# Patient Record
Sex: FEMALE | Race: AMERICAN INDIAN OR ALASKA NATIVE
[De-identification: names, ages, dates, MRNs, and addresses within clinical notes are randomized per-mention and may not be internally consistent; named-entity substitution may affect disease eponyms.]

---

## 2018-08-18 ENCOUNTER — HOSPITAL ENCOUNTER (INPATIENT)
Dept: HOSPITAL 14 - H.EROB2 | Age: 28
LOS: 2 days | Discharge: HOME | DRG: 373 | End: 2018-08-20
Attending: OBSTETRICS & GYNECOLOGY | Admitting: OBSTETRICS & GYNECOLOGY
Payer: COMMERCIAL

## 2018-08-18 VITALS — BODY MASS INDEX: 25.8 KG/M2

## 2018-08-18 VITALS — OXYGEN SATURATION: 98 % | RESPIRATION RATE: 20 BRPM

## 2018-08-18 DIAGNOSIS — Z3A.38: ICD-10-CM

## 2018-08-18 LAB
BASOPHILS # BLD AUTO: 0 K/UL (ref 0–0.2)
BASOPHILS NFR BLD: 0.3 % (ref 0–2)
EOSINOPHIL # BLD AUTO: 0 K/UL (ref 0–0.7)
EOSINOPHIL NFR BLD: 0.4 % (ref 0–4)
ERYTHROCYTE [DISTWIDTH] IN BLOOD BY AUTOMATED COUNT: 14.2 % (ref 11.5–14.5)
HGB BLD-MCNC: 10.9 G/DL (ref 12–16)
LYMPHOCYTES # BLD AUTO: 2 K/UL (ref 1–4.3)
LYMPHOCYTES NFR BLD AUTO: 18.3 % (ref 20–40)
MCH RBC QN AUTO: 31.6 PG (ref 27–31)
MCHC RBC AUTO-ENTMCNC: 33.7 G/DL (ref 33–37)
MCV RBC AUTO: 93.8 FL (ref 81–99)
MONOCYTES # BLD: 0.7 K/UL (ref 0–0.8)
MONOCYTES NFR BLD: 6.4 % (ref 0–10)
NEUTROPHILS # BLD: 8 K/UL (ref 1.8–7)
NEUTROPHILS NFR BLD AUTO: 74.6 % (ref 50–75)
NRBC BLD AUTO-RTO: 0.2 % (ref 0–0)
PLATELET # BLD: 131 K/UL (ref 130–400)
PMV BLD AUTO: 10.7 FL (ref 7.2–11.7)
RBC # BLD AUTO: 3.46 MIL/UL (ref 3.8–5.2)
WBC # BLD AUTO: 10.8 K/UL (ref 4.8–10.8)

## 2018-08-18 PROCEDURE — 10907ZC DRAINAGE OF AMNIOTIC FLUID, THERAPEUTIC FROM PRODUCTS OF CONCEPTION, VIA NATURAL OR ARTIFICIAL OPENING: ICD-10-PCS | Performed by: OBSTETRICS & GYNECOLOGY

## 2018-08-18 PROCEDURE — 4A1HXCZ MONITORING OF PRODUCTS OF CONCEPTION, CARDIAC RATE, EXTERNAL APPROACH: ICD-10-PCS | Performed by: OBSTETRICS & GYNECOLOGY

## 2018-08-18 NOTE — OBHP
===========================

Datetime: 2018 03:57

===========================

   

IP Adm Impression:  Term, intrauterine pregnancy

IP Admit Plan:  Admit to unit; Initiate labor protocol

Admit Comment, IP Provider:  20 yo  at 38+4 wks in labor.  FHT reassuring.  GBS negative.

   Anticipate VD.

   H_P dictated, "[id number]" (ES) 

Extremities - PN:  Normal

Abdomen - PN:  Normal

Back - PN:  Normal

Lungs - PN:  Normal

Heart - PN:  Normal

Thyroid - PN:  Normal

Neurologic - PN:  Normal

HEENT - PN:  Normal

General - PN:  Normal

FHR - Baseline A Provider:  130's

Membranes, Provider:  Bridget

EGA AdmitDate IP:  38.4

Vital Signs Provider:  Reviewed

IP Indication for Induction:  Not Applicable

IP Chief Complaint:  Uterine contractions

NICHD Variability Prov Fetus A:  Moderate 6-25bpm

NICHD Accel Fetus A IP Provider:  15X15

FHR Category Provider Fetus A:  Category I

NICHD Decel Fetus A IP Provider:  None

Dilatation, Provider:  3-4

Effacement, Provider:  75

Station, Provider:  -3

Genitourinary Exam:  Normal

## 2018-08-18 NOTE — HP
Copied To:  Africa Gusman MD

Attending MD:  Africa Gusman MD



HISTORY OF PRESENT ILLNESS:  This is a 27-year-old G7, P6-0-0-6 at 38 weeks

and four days with an EDC of 08/28/2018 by last menstrual period, who

presents with contractions every five minutes.  Denies vaginal bleeding and

leaking of fluid, and reports positive fetal movement.  This patient

received her prenatal care at [facility] with Dr. Berman.  She started her

prenatal care at 31 weeks and she is varicella nonimmune.  GBS was done on

07/27/2018 and that was negative.



PAST MEDICAL HISTORY:  Healthy.



PAST SURGICAL HISTORY:  None.



Her EDC is 08/28/2018 based on her period consistent with a 32 plus week

ultrasound.



MEDICATIONS:  Prenatal vitamins.



ALLERGIES:  NO KNOWN DRUG ALLERGIES.



FAMILY HISTORY:  Noncontributory.



OBSTETRICAL HISTORY:  In 2008, she had a vaginal delivery of a male infant,

weighing 8 pounds 7 ounces.  In 2011, she had a vaginal delivery of a male

infant, weighing 8 pounds 10 ounces.  In 2012, she had a vaginal delivery

of a boy, weighing 8 pounds 2 ounces.  In 2014, she had a vaginal delivery

of a boy, weighing 8 pounds 10 ounces.  In 2015, she had a vaginal delivery

of a boy, weighing 8 pounds 2 ounces.  In 2017, she had a vaginal delivery

of a girl, weighing 8 pounds 2 ounces.



GYNECOLOGICAL HISTORY:  Menarche at 10, regular periods.  No STDs.  Denies

abnormal Paps.



SOCIAL HISTORY:  Denies tobacco, alcohol, or illicit drug use.



PRENATAL LABORATORIES:  On 06/20/2018, O positive, no antibodies detected. 

Cystic fibrosis is negative.  Hemoglobin electrophoresis was normal.  Urine

culture was positive for over 100,000 E. coli.  Hepatitis B surface antigen

was nonreactive.  SMA was negative.  HIV was nonreactive.  Fragile X was

negative.  Varicella-zoster IgG was negative.  Rubella IgG positive.  RPR

nonreactive.  Chlamydia and gonorrhea not detected.  One-hour Glucola was 65.  
On 06/29/2018,

Chlamydia and gonorrhea were negative.   Pap was negative, and on 07/27/2018, 
her GBS was negative.



PHYSICAL EXAMINATION:

VITAL SIGNS:  Afebrile.  Vital signs are stable.

GENERAL:  The patient appears comfortable, sitting up in bed.

HEART:  Regular rate and rhythm.

CHEST AND LUNGS:  Clear to auscultation bilaterally.

ABDOMEN:  Soft and nontender.  Gravid.

EXTREMITIES:  Nontender.

GENITOURINARY:  Vaginal exam is 3 to 4 cm dilated, 75%, -3 station with a

bulging bag at 03:25 a.m.  

External fetal monitoring: Baseline in the 130s with moderate variability.  
Positive accelerations.  Tocodynamometer: Difficult to discern when she is 
chaim.



ASSESSMENT AND PLAN:  This is a 27-year-old G7, P6-0-0-6 at 38 weeks and four 
days, in labor.  

Fetal heart tracing is reassuring.  Group B streptococcus negative.  Anticipate 
vaginal delivery.







__________________________________________

Africa Gusman MD



DD:  08/18/2018 3:52:24

DT:  08/18/2018 5:51:49

Job # [id number]

MTDD

## 2018-08-18 NOTE — OBADHP
===========================

Datetime: 2018 03:57

===========================

   

Admit Comment, IP Provider:  18 yo  at 38+4 wks in labor.  FHT reassuring.  GBS negative.

   Anticipate VD.

   H_P dictated, "[id number]" (ES) 

Extremities - PN:  Normal

Abdomen - PN:  Normal

Back - PN:  Normal

Lungs - PN:  Normal

Heart - PN:  Normal

Thyroid - PN:  Normal

Neurologic - PN:  Normal

HEENT - PN:  Normal

General - PN:  Normal

FHR - Baseline A Provider:  130's

Membranes, Provider:  Bulging

Vital Signs Provider:  Reviewed

IP Chief Complaint:  Uterine contractions

NICHD Variability Prov Fetus A:  Moderate 6-25bpm

NICHD Accel Fetus A IP Provider:  15X15

FHR Category Provider Fetus A:  Category I

NICHD Decel Fetus A IP Provider:  None

Dilatation, Provider:  3-4

Effacement, Provider:  75

Station, Provider:  -3

Genitourinary Exam:  Normal

EGA AdmitDate IP:  38.4

IP Adm Impression:  Term, intrauterine pregnancy

IP Admit Plan:  Admit to unit; Initiate labor protocol

## 2018-08-18 NOTE — OBPN
===========================

Datetime: 08/18/2018 18:46

===========================

   

IP Procedures:  Artificial ROM; Sterile Vag Exam

IP Progress Plan:  Continue present management; Augmentation

Membranes, Provider:  Ruptured (Annotations: Data stored by CPN on behalf of user)

Amniotic Fluid Color, Provider:  Clear

Contraction Comments Provider:  Q2-5min

FHR - Baseline A Provider:  120s-130s

IP Progress Note Comment:  AROM clear fluid. Category 1 fetal heart tracing. Continue current managem
ent. Discussed plan with patient all patient questions answered.

Vital Signs Provider:  Reviewed; Within Normal Limits

NICHD Accel Fetus A IP Provider:  15X15

FHR Category Provider Fetus A:  Category I

NICHD Variability Prov Fetus A:  Moderate 6-25bpm

Dilatation, Provider:  7

Effacement, Provider:  50

Station, Provider:  -1

NICHD Decel Fetus A IP Provider:  None

## 2018-08-18 NOTE — OBDS
===================================

DELIVERY PERSONNEL

===================================

   

Delivery Doctor:  THELMA Berman MD

Circulator:  Sharon Flores RN

Resident:  FELIPE Mcnally 

   

===================================

MATERNAL INFORMATION

===================================

   

Delivery Anesthesia:  None

Medications in Delivery:  Pitocin 30 mu/500 mL

Estimated  Blood Loss (ml):  200

Placenta Cultured:  No

Maternal Complications:  None

Provider Comments:  Normal spontaneous vaginal delivery.

   Patient delivered viable infant with Apgars of 9 and 9 at one and 5 minutes respectively. Perineum
 intact, no lacerations. Placenta delivered spontaneously. Uterus firm and appropriately hemostatic f
ollowing delivery. Patient tolerated delivery well. No complications. Estimated blood loss 200 mL.

   

===================================

LABOR SUMMARY

===================================

   

EDC:  2018 00:00

No. Babies in Womb:  1

 Attempted:  No

Labor Anesthesia:  None

   

===================================

LABOR INFORMATION

===================================

   

Reason for Induction:  Not Applicable

Onset of Labor:  2018 01:00

Complete Dilatation:  2018 19:17

Oxytocin:  Augmentation

Group B Beta Strep:  Negative

Antibiotics # of Doses:  0

Antibiotics Time of Last Dose:  n/a

Steroids Given:  None

Reason Steroids Not Administered:  Not Applicable

   

===================================

MEMBRANES

===================================

   

Membranes Rupture Method:  Artificial

Rupture of Membranes:  2018 18:43

Length of Rupture (hrs):  0.58

Amniotic Fluid Color:  Clear

Amniotic Fluid Amount:  Large

Amniotic Fluid Odor:  Normal

   

===================================

STAGES OF LABOR

===================================

   

Stage 1 hrs:  18

Stage 1 min:  17

Stage 2 hrs:  0

Stage 2 min:  1

Stage 3 hrs:  0

Stage 3 min:  8

Total Time in Labor hrs:  18

Total Time in Labor min:  26

   

===================================

VAGINAL DELIVERY

===================================

   

Episiotomy:  None

Laceration Extension:  N/A

Laceration Type:  None

Laceration Repair:  Not Applicable

Initial Vag Sponge Count:  15

Final Vag Sponge Count:  15

Initial Vag Sharps Count:  0

Final Vag Sharps Count:  0

Sponge Count Correct:  Yes

Sharps Count Correct:  N/A

Count Comment:  count correct

   

===================================

BABY A INFORMATION

===================================

   

Infant Delivery Date/Time:  2018 19:18

Method of Delivery:  Vaginal

Born in Route :  No

:  N/A

Forceps:  Outlet

Vacuum Extraction:  N/A

Shoulder Dystocia :  No

   

===================================

SHOULDER DYSTOCIA BABY A

===================================

   

Infant Delivery Date/Time:  2018 19:18

   

===================================

PRESENTATION/POSITION BABY A

===================================

   

Presentation:  Cephalic

Cephalic Presentation:  Vertex

Breech Presentation:  N/A

   

===================================

PLACENTA INFORMATION BABY A

===================================

   

Placenta Delivery Time :  2018 19:26

Placenta Method of Delivery:  Spontaneous

Placenta Status:  Delivered

   

===================================

APGAR SCORES BABY A

===================================

   

Heart Rate 1 min:  >100 bpm

Resp Effort 1 min:  Good Cry

Reflex Irritability 1 min:  Cough or Sneeze or Pulls Away

Muscle Tone 1 min:  Active Motion

Color 1 min:  Body Pink, Extremities Blue

Resuscitation Effort 1 min:  N/A

APGAR SCORE 1 MIN:  9

Heart Rate 5 min:  >100 bpm

Resp Effort 5 min:  Good Cry

Reflex Irritability 5 min:  Cough or Sneeze or Pulls Away

Muscle Tone 5 min:  Active Motion

Color 5 min:  Body Pink, Extremities Blue

Resuscitation Effort 5 min:  N/A

APGAR SCORE 5 MIN:  9

   

===================================

INFANT INFORMATION BABY A

===================================

   

Gestational Age at Delivery:  38.4

Gestational Status:  Term

Infant Outcome :  Liveborn

Infant Condition :  Stable

Infant Sex:  Male

   

===================================

IDENTIFICATION/MEDS BABY A

===================================

   

ID Band Number:  [id number]

ID Band Location:  Left Leg; Left Arm



Vitamin K Given :  Aquamephyton 1 mg IM

Erythromycin Given:  Given Both Eyes

   

===================================

WEIGHT/LENGTH BABY A

===================================

   

Infant Birthweight (gms):  3640

Infant Weight (lb):  8

Infant Weight (oz):  0

Infant Length Inches:  21.00

Infant Length cms:  53.3

   

===================================

CORD INFORMATION BABY A

===================================

   

No. Cord Vessels:  3

Nuchal Cord :  Around Neck x1, Loose

Cord Blood Taken:  Yes

Infant Suction:  Mouth; Nose

   

===================================

ASSESSMENT BABY A

===================================

   

Infant Complications:  None

Physical Findings at Delivery:  Within Normal Limits

Physical Findings Other:  dr ovalles , assessed the infant at bedside after delivery

Infant Respirations:  Appears Normal

Neonatologist/ALS Called :  No

Infant Care By:  adana   JAMMOTT  RN

Transferred To:  [facility]

   

===================================

RESUSCITATION BABY A

===================================

   

Resuscitation Effort:  Tactile Stimulation

## 2018-08-19 LAB
BASOPHILS # BLD AUTO: 0 K/UL (ref 0–0.2)
BASOPHILS NFR BLD: 0.4 % (ref 0–2)
EOSINOPHIL # BLD AUTO: 0.1 K/UL (ref 0–0.7)
EOSINOPHIL NFR BLD: 0.4 % (ref 0–4)
ERYTHROCYTE [DISTWIDTH] IN BLOOD BY AUTOMATED COUNT: 14.1 % (ref 11.5–14.5)
HGB BLD-MCNC: 10.3 G/DL (ref 12–16)
LYMPHOCYTES # BLD AUTO: 1.9 K/UL (ref 1–4.3)
LYMPHOCYTES NFR BLD AUTO: 15.6 % (ref 20–40)
MCH RBC QN AUTO: 31.3 PG (ref 27–31)
MCHC RBC AUTO-ENTMCNC: 33.3 G/DL (ref 33–37)
MCV RBC AUTO: 94.1 FL (ref 81–99)
MONOCYTES # BLD: 1 K/UL (ref 0–0.8)
MONOCYTES NFR BLD: 7.7 % (ref 0–10)
NEUTROPHILS # BLD: 9.4 K/UL (ref 1.8–7)
NEUTROPHILS NFR BLD AUTO: 75.9 % (ref 50–75)
NRBC BLD AUTO-RTO: 0 % (ref 0–0)
PLATELET # BLD: 122 K/UL (ref 130–400)
PMV BLD AUTO: 10.4 FL (ref 7.2–11.7)
RBC # BLD AUTO: 3.29 MIL/UL (ref 3.8–5.2)
WBC # BLD AUTO: 12.4 K/UL (ref 4.8–10.8)

## 2018-08-19 RX ADMIN — MULTIPLE VITAMINS W/ MINERALS TAB SCH TAB: TAB at 08:24

## 2018-08-19 NOTE — OBPPN
===========================

Datetime: 2018 07:50

===========================

   

PP Pain Prov:  Within normal limits

PP Nausea Prov:  Denies

PP Abdomen/Uterus Prov:  Normal

PP Lochia Prov:  Normal

PP Extremities Prov:  Normal

PP Impression Prov:  Normal postpartum progression

PP Plan Prov:  Continue present management

PP Progress Note Prov:  PPD 1 s/p , doing well, bottle feeding 

   Continue current care 

Vital Signs Provider PP:  Reviewed

## 2018-08-20 VITALS — HEART RATE: 90 BPM | TEMPERATURE: 98 F | SYSTOLIC BLOOD PRESSURE: 100 MMHG | DIASTOLIC BLOOD PRESSURE: 77 MMHG

## 2018-08-20 RX ADMIN — MULTIPLE VITAMINS W/ MINERALS TAB SCH TAB: TAB at 08:56

## 2018-08-20 NOTE — OBDCSUM
===========================

Datetime: 08/20/2018 11:28

===========================

   

Discharged to, Provider:  Home

Follow up at, Provider:  OB

Disch Instr Activity:  Normal activity

Disch Instr Diet:  Regular

Discharge Instructions, Provider:  Routine instructions given

Discharge Diagnosis, Provider:  Term Pregnancy Delivered

Discharge Time:  08/20/2018 11:28

Follow up in weeks, Provider:  6 wks

Disch Referrals:  None

Contraception discussed, Prov:  Yes

## 2018-08-20 NOTE — OBPPN
===========================

Datetime: 08/20/2018 11:27

===========================

   

PP Pain Prov:  Within normal limits

PP Nausea Prov:  Denies

PP Flatus Prov:  Yes

PP Breasts Prov:  Normal

PP Heart Prov:  Normal

PP Lungs Prov:  Normal

PP Abdomen/Uterus Prov:  Normal

PP Lochia Prov:  Normal

PP Vulva/Perineum Prov:  Normal

PP CVA Tenderness Prov:  Normal

PP Extremities Prov:  Normal

PP Comments Phys Exam Prov:  Fundus firm under umbilicus

PP Impression Prov:  Normal postpartum progression

PP Plan Prov:  Continue present management

PP Progress Note Prov:  Patient denies CP, no SOB, no N/V, tolerating PO diet, ambulating/voiding wel
l, mild lochia, abdominal pain tolerable with meds

      

   A/P PPD #2

   1. discharge home

   2. DIscharge instructions reviewed

IP PP Procedures:  None

Vital Signs Provider PP:  Reviewed; Within Normal Limits